# Patient Record
Sex: MALE
[De-identification: names, ages, dates, MRNs, and addresses within clinical notes are randomized per-mention and may not be internally consistent; named-entity substitution may affect disease eponyms.]

---

## 2022-05-24 ENCOUNTER — APPOINTMENT (OUTPATIENT)
Dept: ORTHOPEDIC SURGERY | Facility: CLINIC | Age: 22
End: 2022-05-24

## 2024-02-27 ENCOUNTER — RESULT CHARGE (OUTPATIENT)
Age: 24
End: 2024-02-27

## 2024-02-27 ENCOUNTER — APPOINTMENT (OUTPATIENT)
Dept: UROLOGY | Facility: CLINIC | Age: 24
End: 2024-02-27
Payer: COMMERCIAL

## 2024-02-27 VITALS
SYSTOLIC BLOOD PRESSURE: 123 MMHG | BODY MASS INDEX: 31.1 KG/M2 | WEIGHT: 210 LBS | DIASTOLIC BLOOD PRESSURE: 73 MMHG | HEIGHT: 69 IN

## 2024-02-27 DIAGNOSIS — N50.811 RIGHT TESTICULAR PAIN: ICD-10-CM

## 2024-02-27 DIAGNOSIS — Z78.9 OTHER SPECIFIED HEALTH STATUS: ICD-10-CM

## 2024-02-27 DIAGNOSIS — Z80.1 FAMILY HISTORY OF MALIGNANT NEOPLASM OF TRACHEA, BRONCHUS AND LUNG: ICD-10-CM

## 2024-02-27 DIAGNOSIS — Z80.0 FAMILY HISTORY OF MALIGNANT NEOPLASM OF DIGESTIVE ORGANS: ICD-10-CM

## 2024-02-27 LAB
BILIRUB UR QL STRIP: NORMAL
CLARITY UR: CLEAR
COLLECTION METHOD: NORMAL
GLUCOSE UR-MCNC: NORMAL
HCG UR QL: 0.2 EU/DL
HGB UR QL STRIP.AUTO: NORMAL
KETONES UR-MCNC: NORMAL
LEUKOCYTE ESTERASE UR QL STRIP: NORMAL
NITRITE UR QL STRIP: NORMAL
PH UR STRIP: 7
PROT UR STRIP-MCNC: NORMAL
SP GR UR STRIP: 1.01

## 2024-02-27 PROCEDURE — 99203 OFFICE O/P NEW LOW 30 MIN: CPT

## 2024-02-27 NOTE — REVIEW OF SYSTEMS
[see HPI] : see HPI [Negative] : Heme/Lymph [Eyesight Problems] : eyesight problems [Recent Weight Gain (___ Lbs)] : recent [unfilled] ~Ulb weight gain [Loss Of Hearing] : hearing loss [Testicular Pain] : testicular pain [Joint Pain] : joint pain

## 2024-02-27 NOTE — ASSESSMENT
[FreeTextEntry1] : Testicular pain  Dog jumped on pt hitting right testicle, mild discomfort Physical exam with tenderness to right inguinal area upon palpation Will send for testicular US. I will call pt with results RTO if worsening symptoms or to ER

## 2024-02-27 NOTE — PHYSICAL EXAM
[Normal Appearance] : normal appearance [Well Groomed] : well groomed [General Appearance - In No Acute Distress] : no acute distress [Respiration, Rhythm And Depth] : normal respiratory rhythm and effort [Exaggerated Use Of Accessory Muscles For Inspiration] : no accessory muscle use [Urethral Meatus] : meatus normal [Penis Abnormality] : normal circumcised penis [Urinary Bladder Findings] : the bladder was normal on palpation [Testes Tenderness] : no tenderness of the testes [Testes Mass (___cm)] : there were no testicular masses [] : no rash [Normal Station and Gait] : the gait and station were normal for the patient's age [No Focal Deficits] : no focal deficits [Affect] : the affect was normal [Oriented To Time, Place, And Person] : oriented to person, place, and time [Mood] : the mood was normal [Not Anxious] : not anxious [de-identified] : left testicular prosthetic,

## 2024-02-27 NOTE — HISTORY OF PRESENT ILLNESS
[FreeTextEntry1] : Pt with c/o right testicular pain. He reports 2 days ago his dog jumped on him and hit his right testicle causing some discomfort. This has happened on other occasional as well. Rates pain #1-4/10. Would like to have ultrasound. Denies frequency, urgency, dysuria, hematuria. 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

## 2024-02-28 ENCOUNTER — APPOINTMENT (OUTPATIENT)
Dept: ULTRASOUND IMAGING | Facility: CLINIC | Age: 24
End: 2024-02-28
Payer: COMMERCIAL

## 2024-02-28 ENCOUNTER — OUTPATIENT (OUTPATIENT)
Dept: OUTPATIENT SERVICES | Facility: HOSPITAL | Age: 24
LOS: 1 days | End: 2024-02-28
Payer: COMMERCIAL

## 2024-02-28 ENCOUNTER — TRANSCRIPTION ENCOUNTER (OUTPATIENT)
Age: 24
End: 2024-02-28

## 2024-02-28 ENCOUNTER — RESULT REVIEW (OUTPATIENT)
Age: 24
End: 2024-02-28

## 2024-02-28 DIAGNOSIS — Z98.89 OTHER SPECIFIED POSTPROCEDURAL STATES: Chronic | ICD-10-CM

## 2024-02-28 DIAGNOSIS — N50.811 RIGHT TESTICULAR PAIN: ICD-10-CM

## 2024-02-28 PROCEDURE — 93975 VASCULAR STUDY: CPT

## 2024-02-28 PROCEDURE — 76870 US EXAM SCROTUM: CPT

## 2024-02-28 PROCEDURE — 76870 US EXAM SCROTUM: CPT | Mod: 26

## 2024-02-28 PROCEDURE — 93975 VASCULAR STUDY: CPT | Mod: 26

## 2024-05-28 ENCOUNTER — APPOINTMENT (OUTPATIENT)
Dept: ORTHOPEDIC SURGERY | Facility: CLINIC | Age: 24
End: 2024-05-28
Payer: SELF-PAY

## 2024-05-28 DIAGNOSIS — S43.51XA SPRAIN OF RIGHT ACROMIOCLAVICULAR JOINT, INITIAL ENCOUNTER: ICD-10-CM

## 2024-05-28 DIAGNOSIS — S43.432A SUPERIOR GLENOID LABRUM LESION OF LEFT SHOULDER, INITIAL ENCOUNTER: ICD-10-CM

## 2024-05-28 DIAGNOSIS — M65.849 OTHER SYNOVITIS AND TENOSYNOVITIS, UNSPECIFIED HAND: ICD-10-CM

## 2024-05-28 PROCEDURE — 73030 X-RAY EXAM OF SHOULDER: CPT | Mod: 50

## 2024-05-28 PROCEDURE — 99204 OFFICE O/P NEW MOD 45 MIN: CPT

## 2024-05-28 RX ORDER — MELOXICAM 15 MG/1
15 TABLET ORAL DAILY
Qty: 30 | Refills: 2 | Status: ACTIVE | COMMUNITY
Start: 2024-05-28 | End: 1900-01-01

## 2024-05-28 NOTE — IMAGING
[de-identified] : (Exam: Shoulder)   Laterality is bilateral   Patient is in no acute distress, alert and oriented Sensation is grossly intact to light touch in the hand Motor function is 5/5 in the hand Capillary refill is less than 2 seconds in the fingers Lymphadenopathy is not present Peripheral edema is not present   Skin is intact Swelling is not present Atrophy is not present Scapular winging is not present Deformity of the AC joint is present on RT Deformity of the biceps is not present   Bicipital groove tenderness is  present on LT AC joint tenderness is not present Scapulothoracic tenderness is not present   Active forward elevation is 175 Passive forward elevation is 175 External rotation at the side is 80 Internal rotation behind the back is to the level of T7   Forward elevation strength is 5-/5 External rotation strength at the side is 5/5 Internal rotation strength at the side is 5/5 Deltoid strength of anterior, posterior and lateral heads is 5/5   Dupree test is abnormal OBriens test is abnormal Empty can test is normal Speeds test is normal Cross body adduction test is normal Belly press test is normal Apprehension and relocation is normal Sulcus sign is normal   (Exam: Finger)   Laterality is left Digit is small   Patient is in no acute distress, alert and oriented Sensation is grossly intact to light touch in the hand Motor function is 5/5 in the hand Capillary refill is less than 2 seconds in the fingers Lymphadenopathy is not present Peripheral edema is not present   Skin is intact Nails are intact Swelling is not present Scissoring is not present Extensor lag is not present Boutonniere deformity is not present Mallet deformity is not present   Clicking of the extensor tendon is noted proximal to the MCP joint, no focal tenderness  MP tenderness is not present PIP tenderness is not present DIP tenderness is not present   Extension is normal Flexion is normal   PIP active flexion is normal DIP active flexion is normal PIP and DIP active extension is normal   MP stability to varus is normal MP stability to valgus is normal PIP stability to varus is normal PIP stability to valgus is normal DIP stability to varus is normal DIP stability to valgus is normal    [Bilateral] : shoulder bilaterally [Left] : left fingers [There are no fractures, subluxations or dislocations. No significant abnormalities are seen] : There are no fractures, subluxations or dislocations. No significant abnormalities are seen [FreeTextEntry1] : RT shoulder chronic appearing grade 3 AC separation with CC ligament calcification. LT shoulder no osseous abnormalities.

## 2024-05-28 NOTE — HISTORY OF PRESENT ILLNESS
[de-identified] : Date of evaluation 05/28/3034 Patient age in years is 24 Occupation is paraprofessional Body part causing symptoms is the B/L shoulders (LT>RT) Left shoulder symptoms began 2 months ago, no injury, related to working out at the gym. He has pain with overhead Right shoulder symptoms began 2 years ago after he fell onto the shoulder, diagnosed with sprain and discussed surgery but elected to treat it non surgically. He has minimal pain but notes clicking with overhead. Also reports history of RT humerus fracture as a teenager that healed without surgery Quality of pain is dull Pain score at rest is 4/10 Pain score during activity is 7/10 Radicular symptoms are not present Prior treatments include none Patient's condition is not associated with workers compensation, no-fault or interscholastic athletics  Patient has third complaint of left small finger soreness and pain over the dorsal aspect with associated clicking. states that it has been getting better. no formal treatment.

## 2024-05-28 NOTE — DISCUSSION/SUMMARY
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 The potential non-surgical and surgical treatments were reviewed. The relative risks and benefits of each option were considered relative to the patients age, activity level, medical history, symptom severity and previously attempted treatments.   The patient was advised to consult with their primary medical provider prior to initiation of any new medications to reduce the risk of adverse effects specific to their long-term home medications and medical history. The risk of gastrointestinal irritation and kidney injury specific to long-term NSAID use was discussed.   -Patient will proceed with formal PT for his shoulders. -Recommend that he limit overhead and pressing lifts at the gym until symptoms improve. -Meloxicam as needed for pain. -MRI of the shoulders is discussed and deferred at this time. -For the finger, recommend buddy taping during impact activities as needed for comfort. Hand specialist referral is discussed and deferred. -Follow up in 8 weeks, if shoulder symptoms persist consider MRI arthrogram.    (MDM)   Problem Complexity -Moderate: chronic illness with exacerbation   Risk -Moderate: prescription medication  -Patient has not been seen by another provider in my practice within the past 2 years who specializes in orthopedic surgery.